# Patient Record
Sex: FEMALE | Race: WHITE | URBAN - METROPOLITAN AREA
[De-identification: names, ages, dates, MRNs, and addresses within clinical notes are randomized per-mention and may not be internally consistent; named-entity substitution may affect disease eponyms.]

---

## 2024-10-03 ENCOUNTER — OFFICE VISIT (OUTPATIENT)
Age: 44
End: 2024-10-03

## 2024-10-03 VITALS
OXYGEN SATURATION: 98 % | HEART RATE: 83 BPM | TEMPERATURE: 98.4 F | HEIGHT: 67 IN | DIASTOLIC BLOOD PRESSURE: 88 MMHG | WEIGHT: 143.1 LBS | SYSTOLIC BLOOD PRESSURE: 134 MMHG | BODY MASS INDEX: 22.46 KG/M2

## 2024-10-03 DIAGNOSIS — L23.9 ALLERGIC DERMATITIS: Primary | ICD-10-CM

## 2024-10-03 RX ORDER — METHYLPREDNISOLONE 4 MG
TABLET, DOSE PACK ORAL
Qty: 1 KIT | Refills: 0 | Status: SHIPPED | OUTPATIENT
Start: 2024-10-03 | End: 2024-10-09

## 2024-10-03 RX ORDER — NORGESTIMATE AND ETHINYL ESTRADIOL 0.25-0.035
KIT ORAL
COMMUNITY

## 2024-10-03 RX ORDER — SERTRALINE HYDROCHLORIDE 25 MG/1
25 TABLET, FILM COATED ORAL DAILY
COMMUNITY

## 2024-10-03 ASSESSMENT — ENCOUNTER SYMPTOMS: SHORTNESS OF BREATH: 0

## 2024-10-03 NOTE — PATIENT INSTRUCTIONS
New Prescriptions    METHYLPREDNISOLONE (MEDROL DOSEPACK) 4 MG TABLET    Take by mouth per package directions.      Take the steroid pack as directed.  Continue to take the benadryl as needed for symptom relief.  Use the OTC benadryl cream to the affected site.  Encourage rest and increase fluid intake.  Follow-up with your PCP as needed.  Return for severe/worsening symptoms.

## 2024-10-03 NOTE — PROGRESS NOTES
Zohra Zheng (:  1980) is a 44 y.o. female,New patient, here for evaluation of the following chief complaint(s):  Allergic Reaction (Allergic reaction on face and neck for three days.Unknown cause at this time. Areas are slightly itchy, redness and getting worse. )      Assessment & Plan :  Visit Diagnoses and Associated Orders       Allergic dermatitis    -  Primary    methylPREDNISolone (MEDROL DOSEPACK) 4 MG tablet [4991]           ORDERS WITHOUT AN ASSOCIATED DIAGNOSIS    ESTARYLLA 0.25-35 MG-MCG per tablet [[id number]]      sertraline (ZOLOFT) 25 MG tablet []            Differential diagnoses: contact dermatitis, allergic dermatitis, poison ivy dermatitis, shingles, scabies, tinea, measles, viral exanthem, impetigo, cellulitis     Plan: Appears to have an allergic dermatitis of unknown cause. She will be prescribed an oral steroid pack for symptom relief. Advised to continue to take benadryl as needed for symptom relief and to try cool compresses to the site. Advised to keep her skin clean and dry and follow-up with her PCP as needed. Return precautions discussed. Follow up in 3 days if symptoms persist or if symptoms worsen.       Subjective :  HPI  Zohra Zheng is a 44 y.o. female who presents with complaints of a rash. She states that she has noted a rash to her face and neck starting on Tuesday morning. She states that she did stay in a hotel this weekend and is not sure if the detergent used on the sheets is causing the rash. She also reports that she took baclofen this weekend for her back pain. She states that she has taken the baclofen in the past and did not have a reaction. She denies any changes in detergents, lotions, or soaps. She denies that she has been working outside. She denies that anyone else at home has a similar rash. She states that the rash has been itchy and she has been taking benadryl with moderate improvement but continues to note the rash. She denies any fevers. She

## 2025-05-14 ENCOUNTER — OFFICE VISIT (OUTPATIENT)
Age: 45
End: 2025-05-14

## 2025-05-14 VITALS
DIASTOLIC BLOOD PRESSURE: 86 MMHG | HEIGHT: 67 IN | SYSTOLIC BLOOD PRESSURE: 128 MMHG | HEART RATE: 90 BPM | WEIGHT: 137 LBS | BODY MASS INDEX: 21.5 KG/M2 | OXYGEN SATURATION: 98 % | TEMPERATURE: 98.1 F

## 2025-05-14 DIAGNOSIS — R35.0 FREQUENT URINATION: Primary | ICD-10-CM

## 2025-05-14 LAB
BILIRUBIN, POC: ABNORMAL
BLOOD URINE, POC: ABNORMAL
CLARITY, POC: ABNORMAL
COLOR, POC: YELLOW
GLUCOSE URINE, POC: ABNORMAL MG/DL
KETONES, POC: ABNORMAL MG/DL
LEUKOCYTE EST, POC: ABNORMAL
NITRITE, POC: ABNORMAL
PH, POC: 5.5
PROTEIN, POC: ABNORMAL MG/DL
SPECIFIC GRAVITY, POC: <=1.005
UROBILINOGEN, POC: 0.2 MG/DL

## 2025-05-14 RX ORDER — FLUCONAZOLE 150 MG/1
150 TABLET ORAL ONCE
Qty: 1 TABLET | Refills: 0 | Status: SHIPPED | OUTPATIENT
Start: 2025-05-14 | End: 2025-05-14

## 2025-05-14 RX ORDER — NITROFURANTOIN 25; 75 MG/1; MG/1
100 CAPSULE ORAL 2 TIMES DAILY
Qty: 14 CAPSULE | Refills: 0 | Status: SHIPPED | OUTPATIENT
Start: 2025-05-14 | End: 2025-05-21

## 2025-05-14 RX ORDER — LORAZEPAM 0.5 MG/1
.25-.5 TABLET ORAL EVERY 6 HOURS PRN
COMMUNITY
Start: 2025-04-25 | End: 2025-05-25

## 2025-05-14 NOTE — PROGRESS NOTES
Zohra Zheng (:  1980) is a 45 y.o. female,Established patient, here for evaluation of the following chief complaint(s):  Urinary Tract Infection (Frequent urination, bladder heaviness for two weeks )      ASSESSMENT/PLAN:  1. Frequent urination  - POCT Urinalysis no Micro  - fluconazole (DIFLUCAN) 150 MG tablet; Take 1 tablet by mouth once for 1 dose  Dispense: 1 tablet; Refill: 0  - nitrofurantoin, macrocrystal-monohydrate, (MACROBID) 100 MG capsule; Take 1 capsule by mouth 2 times daily for 7 days  Dispense: 14 capsule; Refill: 0  - Culture, Urine       Return if symptoms worsen or fail to improve.  0 Result Notes      Component  Ref Range & Units (hover) 25 0910   Color, UA yellow   Clarity, UA cloudy   Glucose, UA POC neg   Bilirubin, UA neg   Ketones, UA neg   Spec Grav, UA <=1.005   Blood, UA POC trace   pH, UA 5.5   Protein, UA POC neg   Urobilinogen, UA 0.2   Leukocytes, UA neg   Nitrite, UA neg        SUBJECTIVE/OBJECTIVE:  PRESENT TO CLINIC WITH FREQUENT URINATION AND BLADDER FEELS HEAVINESS FOR TWO WEEKS.NO FEVER. NO NAUSEA OR VOMITING      History provided by:  Patient      Vitals:    25 0904   BP: 128/86   BP Site: Left Upper Arm   Patient Position: Sitting   BP Cuff Size: Medium Adult   Pulse: 90   Temp: 98.1 °F (36.7 °C)   TempSrc: Oral   SpO2: 98%   Weight: 62.1 kg (137 lb)   Height: 1.702 m (5' 7\")       Review of Systems   Genitourinary:  Positive for frequency.       Physical Exam  Constitutional:       Appearance: Normal appearance.   HENT:      Head: Normocephalic and atraumatic.      Nose: Nose normal.      Mouth/Throat:      Mouth: Mucous membranes are moist.   Eyes:      Pupils: Pupils are equal, round, and reactive to light.   Pulmonary:      Effort: Pulmonary effort is normal.      Breath sounds: Normal breath sounds.   Abdominal:      General: Abdomen is flat. Bowel sounds are normal.      Palpations: Abdomen is soft.      Tenderness: There is abdominal tenderness.

## 2025-05-16 LAB — BACTERIA UR CULT: NORMAL

## 2025-05-19 ENCOUNTER — RESULTS FOLLOW-UP (OUTPATIENT)
Age: 45
End: 2025-05-19